# Patient Record
Sex: MALE | Race: WHITE | NOT HISPANIC OR LATINO
[De-identification: names, ages, dates, MRNs, and addresses within clinical notes are randomized per-mention and may not be internally consistent; named-entity substitution may affect disease eponyms.]

---

## 2018-12-11 PROBLEM — Z00.00 ENCOUNTER FOR PREVENTIVE HEALTH EXAMINATION: Status: ACTIVE | Noted: 2018-12-11

## 2018-12-12 ENCOUNTER — APPOINTMENT (OUTPATIENT)
Dept: ENDOCRINOLOGY | Facility: CLINIC | Age: 53
End: 2018-12-12
Payer: MEDICAID

## 2018-12-12 VITALS
TEMPERATURE: 98 F | HEART RATE: 91 BPM | WEIGHT: 134 LBS | DIASTOLIC BLOOD PRESSURE: 80 MMHG | SYSTOLIC BLOOD PRESSURE: 110 MMHG | BODY MASS INDEX: 22.33 KG/M2 | OXYGEN SATURATION: 98 % | HEIGHT: 64.96 IN

## 2018-12-12 DIAGNOSIS — Z87.891 PERSONAL HISTORY OF NICOTINE DEPENDENCE: ICD-10-CM

## 2018-12-12 DIAGNOSIS — Z86.39 PERSONAL HISTORY OF OTHER ENDOCRINE, NUTRITIONAL AND METABOLIC DISEASE: ICD-10-CM

## 2018-12-12 DIAGNOSIS — Z78.9 OTHER SPECIFIED HEALTH STATUS: ICD-10-CM

## 2018-12-12 DIAGNOSIS — Z86.59 PERSONAL HISTORY OF OTHER MENTAL AND BEHAVIORAL DISORDERS: ICD-10-CM

## 2018-12-12 PROCEDURE — 36415 COLL VENOUS BLD VENIPUNCTURE: CPT

## 2018-12-12 PROCEDURE — 99204 OFFICE O/P NEW MOD 45 MIN: CPT | Mod: 25

## 2018-12-12 RX ORDER — METHIMAZOLE 5 MG/1
5 TABLET ORAL DAILY
Qty: 90 | Refills: 0 | Status: ACTIVE | COMMUNITY
Start: 2018-12-12 | End: 1900-01-01

## 2018-12-14 LAB
ERYTHROCYTE [SEDIMENTATION RATE] IN BLOOD BY WESTERGREN METHOD: 14 MM/HR
T3 SERPL-MCNC: 128 NG/DL

## 2018-12-17 LAB — TSI ACT/NOR SER: <0.1 IU/L

## 2018-12-19 ENCOUNTER — APPOINTMENT (OUTPATIENT)
Dept: ENDOCRINOLOGY | Facility: CLINIC | Age: 53
End: 2018-12-19
Payer: MEDICAID

## 2018-12-19 PROCEDURE — 36415 COLL VENOUS BLD VENIPUNCTURE: CPT

## 2018-12-20 LAB
ALBUMIN SERPL ELPH-MCNC: 4.5 G/DL
ALP BLD-CCNC: 61 U/L
ALT SERPL-CCNC: 13 U/L
ANION GAP SERPL CALC-SCNC: 10 MMOL/L
AST SERPL-CCNC: 20 U/L
BASOPHILS # BLD AUTO: 0.04 K/UL
BASOPHILS NFR BLD AUTO: 0.7 %
BILIRUB SERPL-MCNC: 0.4 MG/DL
BUN SERPL-MCNC: 21 MG/DL
CALCIUM SERPL-MCNC: 9.6 MG/DL
CHLORIDE SERPL-SCNC: 105 MMOL/L
CO2 SERPL-SCNC: 25 MMOL/L
CREAT SERPL-MCNC: 0.86 MG/DL
EOSINOPHIL # BLD AUTO: 0.12 K/UL
EOSINOPHIL NFR BLD AUTO: 2.1 %
GLUCOSE SERPL-MCNC: 86 MG/DL
HCT VFR BLD CALC: 45 %
HGB BLD-MCNC: 14.3 G/DL
IMM GRANULOCYTES NFR BLD AUTO: 0 %
LYMPHOCYTES # BLD AUTO: 1.83 K/UL
LYMPHOCYTES NFR BLD AUTO: 31.4 %
MAN DIFF?: NORMAL
MCHC RBC-ENTMCNC: 28.5 PG
MCHC RBC-ENTMCNC: 31.8 GM/DL
MCV RBC AUTO: 89.8 FL
MONOCYTES # BLD AUTO: 0.37 K/UL
MONOCYTES NFR BLD AUTO: 6.3 %
NEUTROPHILS # BLD AUTO: 3.47 K/UL
NEUTROPHILS NFR BLD AUTO: 59.5 %
PLATELET # BLD AUTO: 372 K/UL
POTASSIUM SERPL-SCNC: 5 MMOL/L
PROT SERPL-MCNC: 7.4 G/DL
RBC # BLD: 5.01 M/UL
RBC # FLD: 13.2 %
SODIUM SERPL-SCNC: 140 MMOL/L
T4 FREE SERPL-MCNC: 2.2 NG/DL
TSH SERPL-ACNC: <0.01 UIU/ML
WBC # FLD AUTO: 5.83 K/UL

## 2018-12-26 ENCOUNTER — APPOINTMENT (OUTPATIENT)
Dept: ENDOCRINOLOGY | Facility: CLINIC | Age: 53
End: 2018-12-26
Payer: MEDICAID

## 2018-12-26 VITALS
DIASTOLIC BLOOD PRESSURE: 58 MMHG | TEMPERATURE: 97.9 F | HEIGHT: 64.96 IN | OXYGEN SATURATION: 98 % | HEART RATE: 78 BPM | BODY MASS INDEX: 22.33 KG/M2 | WEIGHT: 134 LBS | SYSTOLIC BLOOD PRESSURE: 106 MMHG

## 2018-12-26 PROCEDURE — 99214 OFFICE O/P EST MOD 30 MIN: CPT | Mod: 25

## 2018-12-26 PROCEDURE — 36415 COLL VENOUS BLD VENIPUNCTURE: CPT

## 2018-12-27 LAB
ALBUMIN SERPL ELPH-MCNC: 4.3 G/DL
ALP BLD-CCNC: 71 U/L
ALT SERPL-CCNC: 19 U/L
ANION GAP SERPL CALC-SCNC: 12 MMOL/L
AST SERPL-CCNC: 20 U/L
BASOPHILS # BLD AUTO: 0.03 K/UL
BASOPHILS NFR BLD AUTO: 0.5 %
BILIRUB SERPL-MCNC: 0.3 MG/DL
BUN SERPL-MCNC: 16 MG/DL
CALCIUM SERPL-MCNC: 9.9 MG/DL
CHLORIDE SERPL-SCNC: 106 MMOL/L
CO2 SERPL-SCNC: 24 MMOL/L
CREAT SERPL-MCNC: 0.84 MG/DL
EOSINOPHIL # BLD AUTO: 0.14 K/UL
EOSINOPHIL NFR BLD AUTO: 2.6 %
GLUCOSE SERPL-MCNC: 93 MG/DL
HCT VFR BLD CALC: 44.8 %
HGB BLD-MCNC: 14.1 G/DL
IMM GRANULOCYTES NFR BLD AUTO: 0.2 %
LYMPHOCYTES # BLD AUTO: 1.74 K/UL
LYMPHOCYTES NFR BLD AUTO: 31.8 %
MAN DIFF?: NORMAL
MCHC RBC-ENTMCNC: 28.5 PG
MCHC RBC-ENTMCNC: 31.5 GM/DL
MCV RBC AUTO: 90.7 FL
MONOCYTES # BLD AUTO: 0.25 K/UL
MONOCYTES NFR BLD AUTO: 4.6 %
NEUTROPHILS # BLD AUTO: 3.3 K/UL
NEUTROPHILS NFR BLD AUTO: 60.3 %
PLATELET # BLD AUTO: 325 K/UL
POTASSIUM SERPL-SCNC: 4.7 MMOL/L
PROT SERPL-MCNC: 7.3 G/DL
RBC # BLD: 4.94 M/UL
RBC # FLD: 13.1 %
SODIUM SERPL-SCNC: 142 MMOL/L
T3 SERPL-MCNC: 160 NG/DL
T4 FREE SERPL-MCNC: 2 NG/DL
TSH SERPL-ACNC: <0.01 UIU/ML
WBC # FLD AUTO: 5.47 K/UL

## 2019-01-17 ENCOUNTER — APPOINTMENT (OUTPATIENT)
Dept: ENDOCRINOLOGY | Facility: CLINIC | Age: 54
End: 2019-01-17
Payer: MEDICAID

## 2019-01-17 VITALS
SYSTOLIC BLOOD PRESSURE: 106 MMHG | WEIGHT: 134 LBS | TEMPERATURE: 97.7 F | HEART RATE: 66 BPM | BODY MASS INDEX: 22.33 KG/M2 | DIASTOLIC BLOOD PRESSURE: 68 MMHG | OXYGEN SATURATION: 98 % | HEIGHT: 64.96 IN

## 2019-01-17 DIAGNOSIS — E05.90 THYROTOXICOSIS, UNSPECIFIED W/OUT THYROTOXIC CRISIS OR STORM: ICD-10-CM

## 2019-01-17 PROCEDURE — 99213 OFFICE O/P EST LOW 20 MIN: CPT | Mod: 25

## 2019-01-17 PROCEDURE — 36415 COLL VENOUS BLD VENIPUNCTURE: CPT

## 2019-01-17 NOTE — REVIEW OF SYSTEMS
[Recent Weight Gain (___ Lbs)] : recent [unfilled] ~Ulb weight gain [All other systems negative] : All other systems negative

## 2019-01-17 NOTE — PHYSICAL EXAM
[Alert] : alert [No Acute Distress] : no acute distress [Well Nourished] : well nourished [Well Developed] : well developed [Healthy Appearance] : healthy appearance [Normal Voice/Communication] : normal voice communication [Normal Sclera/Conjunctiva] : normal sclera/conjunctiva [No Proptosis] : no proptosis [No Lid Lag] : no lid lag [Normal Oropharynx] : the oropharynx was normal [No Neck Mass] : no neck mass was observed [Supple] : the neck was supple [No LAD] : no lymphadenopathy [Thyroid Not Enlarged] : the thyroid was not enlarged [No Thyroid Nodules] : there were no palpable thyroid nodules [No Respiratory Distress] : no respiratory distress [Normal Rate and Effort] : normal respiratory rhythm and effort [Clear to Auscultation] : lungs were clear to auscultation bilaterally [Normal Rate] : heart rate was normal  [Normal S1, S2] : normal S1 and S2 [Regular Rhythm] : with a regular rhythm [No Edema] : there was no peripheral edema [Not Tender] : non-tender [Soft] : abdomen soft [Not Distended] : not distended [No Stigmata of Cushings Syndrome] : no stigmata of cushings syndrome [Normal Gait] : normal gait [No Rash] : no rash [Oriented x3] : oriented to person, place, and time [Normal Insight/Judgement] : insight and judgment were intact [Normal Affect] : the affect was normal [Normal Mood] : the mood was normal [Recent Memory Normal] : recent memory was not impaired [de-identified] : left arm in brace [de-identified] : patellar

## 2019-01-17 NOTE — HISTORY OF PRESENT ILLNESS
[FreeTextEntry1] : CC: "Thyroid check"\par This is a 53 year old male with hyperthyroidism likely due to Graves Disease here for follow up. He has been on methimazole 5mg qd since 12/14/18. He reports that his stress/anxiety has improved since starting methimazole. He denies sore throat/fever/pruritis/change in stool or urine color.   \par Per pt he denies prior history of thyroid disorder. Denies family history of thyroid disease. There is no history of Amiodarone, glucocorticoid, or Lithium use. He denies recent URI or IV contrast. \par \par \par

## 2019-01-17 NOTE — ASSESSMENT
[FreeTextEntry1] : 53 year old male with hyperthyroidism likely due to Graves Disease on methimazole 5mg QD, here for follow up. \par 1.Hyperthyroidism \par -he is clinically euthyroid and reports feeling improved since starting methimazole \par -cont methimazole 5mg qd for now \par -check labs today including CBC, CMP, TSH, free T4, total T3\par -treatment options for hyperthyroidism reviewed including continuing methimazole vs radioactive iodine vs thyroidectomy. He is interested in radioactive iodine. Will discuss further after bloodwork is reviewed.  [Methimazole Therapy] : Risks and benefits of methimazole therapy were discussed with the patient,  including rash, liver dysfunction, and agranulocytosis.  Patient was instructed to call the office for flu-like symptoms eg fever and sore-throat

## 2019-01-18 LAB
ALBUMIN SERPL ELPH-MCNC: 3.9 G/DL
ALP BLD-CCNC: 56 U/L
ALT SERPL-CCNC: 17 U/L
ANION GAP SERPL CALC-SCNC: 12 MMOL/L
AST SERPL-CCNC: 20 U/L
BASOPHILS # BLD AUTO: 0.02 K/UL
BASOPHILS NFR BLD AUTO: 0.4 %
BILIRUB SERPL-MCNC: 0.4 MG/DL
BUN SERPL-MCNC: 15 MG/DL
CALCIUM SERPL-MCNC: 9.6 MG/DL
CHLORIDE SERPL-SCNC: 103 MMOL/L
CO2 SERPL-SCNC: 24 MMOL/L
CREAT SERPL-MCNC: 0.77 MG/DL
EOSINOPHIL # BLD AUTO: 0.12 K/UL
EOSINOPHIL NFR BLD AUTO: 2.3 %
GLUCOSE SERPL-MCNC: 108 MG/DL
HCT VFR BLD CALC: 43.5 %
HGB BLD-MCNC: 13.9 G/DL
IMM GRANULOCYTES NFR BLD AUTO: 0.2 %
LYMPHOCYTES # BLD AUTO: 1.72 K/UL
LYMPHOCYTES NFR BLD AUTO: 32.7 %
MAN DIFF?: NORMAL
MCHC RBC-ENTMCNC: 28.6 PG
MCHC RBC-ENTMCNC: 32 GM/DL
MCV RBC AUTO: 89.5 FL
MONOCYTES # BLD AUTO: 0.24 K/UL
MONOCYTES NFR BLD AUTO: 4.6 %
NEUTROPHILS # BLD AUTO: 3.15 K/UL
NEUTROPHILS NFR BLD AUTO: 59.8 %
PLATELET # BLD AUTO: 340 K/UL
POTASSIUM SERPL-SCNC: 4.4 MMOL/L
PROT SERPL-MCNC: 7 G/DL
RBC # BLD: 4.86 M/UL
RBC # FLD: 12.8 %
SODIUM SERPL-SCNC: 139 MMOL/L
T3 SERPL-MCNC: 156 NG/DL
T4 FREE SERPL-MCNC: 2.1 NG/DL
TSH SERPL-ACNC: <0.01 UIU/ML
WBC # FLD AUTO: 5.26 K/UL

## 2019-02-05 ENCOUNTER — APPOINTMENT (OUTPATIENT)
Dept: ENDOCRINOLOGY | Facility: CLINIC | Age: 54
End: 2019-02-05
Payer: MEDICAID

## 2019-02-05 PROCEDURE — 36415 COLL VENOUS BLD VENIPUNCTURE: CPT

## 2019-02-06 LAB
ALBUMIN SERPL ELPH-MCNC: 4.2 G/DL
ALP BLD-CCNC: 60 U/L
ALT SERPL-CCNC: 14 U/L
ANION GAP SERPL CALC-SCNC: 12 MMOL/L
AST SERPL-CCNC: 18 U/L
BASOPHILS # BLD AUTO: 0.02 K/UL
BASOPHILS NFR BLD AUTO: 0.4 %
BILIRUB SERPL-MCNC: 0.7 MG/DL
BUN SERPL-MCNC: 13 MG/DL
CALCIUM SERPL-MCNC: 9.5 MG/DL
CHLORIDE SERPL-SCNC: 104 MMOL/L
CO2 SERPL-SCNC: 25 MMOL/L
CREAT SERPL-MCNC: 0.8 MG/DL
EOSINOPHIL # BLD AUTO: 0.14 K/UL
EOSINOPHIL NFR BLD AUTO: 2.8 %
GLUCOSE SERPL-MCNC: 88 MG/DL
HCT VFR BLD CALC: 43.6 %
HGB BLD-MCNC: 13.5 G/DL
IMM GRANULOCYTES NFR BLD AUTO: 0.2 %
LYMPHOCYTES # BLD AUTO: 1.57 K/UL
LYMPHOCYTES NFR BLD AUTO: 31.3 %
MAN DIFF?: NORMAL
MCHC RBC-ENTMCNC: 27.9 PG
MCHC RBC-ENTMCNC: 31 GM/DL
MCV RBC AUTO: 90.1 FL
MONOCYTES # BLD AUTO: 0.37 K/UL
MONOCYTES NFR BLD AUTO: 7.4 %
NEUTROPHILS # BLD AUTO: 2.9 K/UL
NEUTROPHILS NFR BLD AUTO: 57.9 %
PLATELET # BLD AUTO: 309 K/UL
POTASSIUM SERPL-SCNC: 4.6 MMOL/L
PROT SERPL-MCNC: 6.9 G/DL
RBC # BLD: 4.84 M/UL
RBC # FLD: 13.3 %
SODIUM SERPL-SCNC: 141 MMOL/L
WBC # FLD AUTO: 5.01 K/UL

## 2019-02-07 LAB
T3 SERPL-MCNC: 145 NG/DL
T4 FREE SERPL-MCNC: 1.8 NG/DL
TSH SERPL-ACNC: <0.01 UIU/ML

## 2019-02-25 ENCOUNTER — APPOINTMENT (OUTPATIENT)
Dept: NUCLEAR MEDICINE | Facility: HOSPITAL | Age: 54
End: 2019-02-25

## 2019-02-26 ENCOUNTER — APPOINTMENT (OUTPATIENT)
Dept: NUCLEAR MEDICINE | Facility: HOSPITAL | Age: 54
End: 2019-02-26

## 2019-02-28 ENCOUNTER — OUTPATIENT (OUTPATIENT)
Dept: OUTPATIENT SERVICES | Facility: HOSPITAL | Age: 54
LOS: 1 days | End: 2019-02-28
Payer: COMMERCIAL

## 2019-03-01 PROCEDURE — 78014 THYROID IMAGING W/BLOOD FLOW: CPT | Mod: 26

## 2019-03-01 PROCEDURE — A9516: CPT

## 2019-03-01 PROCEDURE — 78014 THYROID IMAGING W/BLOOD FLOW: CPT

## 2019-04-26 ENCOUNTER — APPOINTMENT (OUTPATIENT)
Dept: ENDOCRINOLOGY | Facility: CLINIC | Age: 54
End: 2019-04-26

## 2020-05-27 ENCOUNTER — TRANSCRIPTION ENCOUNTER (OUTPATIENT)
Age: 55
End: 2020-05-27

## 2020-05-27 VITALS
HEIGHT: 64 IN | WEIGHT: 115.08 LBS | SYSTOLIC BLOOD PRESSURE: 127 MMHG | HEART RATE: 73 BPM | RESPIRATION RATE: 16 BRPM | TEMPERATURE: 98 F | DIASTOLIC BLOOD PRESSURE: 79 MMHG | OXYGEN SATURATION: 99 %

## 2020-05-27 NOTE — H&P ADULT - PROBLEM SELECTOR PLAN 1
Admit to Orthopaedic Service.  Presents today for elective ACDF c5-6  Pt medically stable and cleared for procedure today by

## 2020-05-27 NOTE — H&P ADULT - NSHPLABSRESULTS_GEN_ALL_CORE
Preop cbc, bmp, pt/inr, ptt, ua wnl;  3M DOS  preop cxr wnl per clearance  preop ekg NSR wnl per clearance Preop cbc, bmp, pt/inr, ptt, ua wnl;  3M DOS  preop cxr wnl per clearance  preop ekg NSR wnl per clearance  COVID19 swab negative 5/26/2020

## 2020-05-27 NOTE — H&P ADULT - NSHPPHYSICALEXAM_GEN_ALL_CORE
PE: Normal head, atraumatic. Normal skin, no rashes/lesions/erythema.        Rest of PE per medical clearance. General:  Alert and oriented, NAD  MSK:  Decreased ROM of cervical spine secondary to pain.   AIN/PIN/Ulnar nerve intact b/l ue   Radial pulse 2+ b/l ue  Gross sensation to light touch diminished throughout LUE compared to RUE   RUE:  biceps/triceps//delt 5/5   LUE /triceps/delt 3/5, biceps 5/5  Remainder of PE as per medical clearance       Rest of PE per medical clearance.

## 2020-05-27 NOTE — H&P ADULT - HISTORY OF PRESENT ILLNESS
54yM with neck pain x  Patient here for ACDF C5-6 54yM with neck pain radiating down LUE x 2 years starting november 2018 s/p fall.  Pt states pain began s/p falling off a scaffolding landing on his neck and LUE.  Pt states that he has been experiencing numbness throughout his LUE.   Pt takes naproxene for his pain with little relief.   Pt ambulates independently without assistive device.  Pt has attempted and faild conservative treatement for his cervical spine pain consisting of multiple ROSEMARIE and PT.  Pt not on any anticoagulation meds and denies hx of DVT. Pt denies numbness and tingling down RUE, fever, chills, recent illness, CP, SOB any other complaints.   Patient here for ACDF C5-6

## 2020-05-28 ENCOUNTER — RESULT REVIEW (OUTPATIENT)
Age: 55
End: 2020-05-28

## 2020-05-28 ENCOUNTER — INPATIENT (INPATIENT)
Facility: HOSPITAL | Age: 55
LOS: 0 days | Discharge: HOME | DRG: 473 | End: 2020-05-29
Attending: ORTHOPAEDIC SURGERY | Admitting: ORTHOPAEDIC SURGERY
Payer: OTHER MISCELLANEOUS

## 2020-05-28 DIAGNOSIS — C64.9 MALIGNANT NEOPLASM OF UNSPECIFIED KIDNEY, EXCEPT RENAL PELVIS: ICD-10-CM

## 2020-05-28 DIAGNOSIS — N20.0 CALCULUS OF KIDNEY: ICD-10-CM

## 2020-05-28 DIAGNOSIS — E06.3 AUTOIMMUNE THYROIDITIS: ICD-10-CM

## 2020-05-28 DIAGNOSIS — M54.12 RADICULOPATHY, CERVICAL REGION: ICD-10-CM

## 2020-05-28 PROCEDURE — 88304 TISSUE EXAM BY PATHOLOGIST: CPT | Mod: 26

## 2020-05-28 RX ORDER — CYCLOBENZAPRINE HYDROCHLORIDE 10 MG/1
5 TABLET, FILM COATED ORAL EVERY 8 HOURS
Refills: 0 | Status: DISCONTINUED | OUTPATIENT
Start: 2020-05-28 | End: 2020-05-29

## 2020-05-28 RX ORDER — OXYCODONE AND ACETAMINOPHEN 5; 325 MG/1; MG/1
2 TABLET ORAL EVERY 6 HOURS
Refills: 0 | Status: DISCONTINUED | OUTPATIENT
Start: 2020-05-28 | End: 2020-05-29

## 2020-05-28 RX ORDER — DEXAMETHASONE 0.5 MG/5ML
10 ELIXIR ORAL EVERY 8 HOURS
Refills: 0 | Status: COMPLETED | OUTPATIENT
Start: 2020-05-28 | End: 2020-05-29

## 2020-05-28 RX ORDER — HYDROMORPHONE HYDROCHLORIDE 2 MG/ML
0.5 INJECTION INTRAMUSCULAR; INTRAVENOUS; SUBCUTANEOUS
Refills: 0 | Status: DISCONTINUED | OUTPATIENT
Start: 2020-05-28 | End: 2020-05-29

## 2020-05-28 RX ORDER — OXYCODONE AND ACETAMINOPHEN 5; 325 MG/1; MG/1
1 TABLET ORAL EVERY 4 HOURS
Refills: 0 | Status: DISCONTINUED | OUTPATIENT
Start: 2020-05-28 | End: 2020-05-29

## 2020-05-28 RX ORDER — SENNA PLUS 8.6 MG/1
2 TABLET ORAL AT BEDTIME
Refills: 0 | Status: DISCONTINUED | OUTPATIENT
Start: 2020-05-28 | End: 2020-05-29

## 2020-05-28 RX ORDER — SODIUM CHLORIDE 9 MG/ML
1000 INJECTION, SOLUTION INTRAVENOUS
Refills: 0 | Status: DISCONTINUED | OUTPATIENT
Start: 2020-05-28 | End: 2020-05-29

## 2020-05-28 RX ORDER — ONDANSETRON 8 MG/1
4 TABLET, FILM COATED ORAL EVERY 6 HOURS
Refills: 0 | Status: DISCONTINUED | OUTPATIENT
Start: 2020-05-28 | End: 2020-05-29

## 2020-05-28 RX ORDER — CEFAZOLIN SODIUM 1 G
2000 VIAL (EA) INJECTION EVERY 8 HOURS
Refills: 0 | Status: COMPLETED | OUTPATIENT
Start: 2020-05-28 | End: 2020-05-29

## 2020-05-28 RX ORDER — ACETAMINOPHEN 500 MG
650 TABLET ORAL EVERY 6 HOURS
Refills: 0 | Status: DISCONTINUED | OUTPATIENT
Start: 2020-05-28 | End: 2020-05-29

## 2020-05-28 RX ADMIN — Medication 100 MILLIGRAM(S): at 17:38

## 2020-05-28 RX ADMIN — OXYCODONE AND ACETAMINOPHEN 1 TABLET(S): 5; 325 TABLET ORAL at 14:20

## 2020-05-28 RX ADMIN — SODIUM CHLORIDE 125 MILLILITER(S): 9 INJECTION, SOLUTION INTRAVENOUS at 21:32

## 2020-05-28 RX ADMIN — CYCLOBENZAPRINE HYDROCHLORIDE 5 MILLIGRAM(S): 10 TABLET, FILM COATED ORAL at 21:17

## 2020-05-28 RX ADMIN — Medication 102 MILLIGRAM(S): at 18:29

## 2020-05-28 RX ADMIN — HYDROMORPHONE HYDROCHLORIDE 0.5 MILLIGRAM(S): 2 INJECTION INTRAMUSCULAR; INTRAVENOUS; SUBCUTANEOUS at 09:50

## 2020-05-28 NOTE — PROGRESS NOTE ADULT - SUBJECTIVE AND OBJECTIVE BOX
Orthopaedics Post Op Check    Procedure: ACDF C5-C6  Surgeon: Dr. Juarez     Pt comfortable, without complaints  Denies CP, SOB, N/V, numbness/tingling     Vital Signs Last 24 Hrs  T(C): 36.6 (28 May 2020 12:00), Max: 36.6 (28 May 2020 12:00)  T(F): 97.8 (28 May 2020 12:00), Max: 97.8 (28 May 2020 12:00)  HR: 71 (28 May 2020 13:30) (60 - 82)  BP: 117/71 (28 May 2020 13:30) (116/70 - 138/82)  BP(mean): 84 (28 May 2020 12:00) (84 - 103)  RR: 17 (28 May 2020 13:30) (12 - 19)  SpO2: 96% (28 May 2020 13:30) (96% - 100%)  AVSS, NAD    Dressing C/D/I; HV x 1   General: Pt Alert and oriented     Sensation intact to bilateral UE distally. Motor Strength 4/5 to /interossei/triceps/biceps Left upper extremity, 5/5 right upper extremity. AIN/PIN intact.   Radial pulses 2+         Post op XR: Fluoroscopy utilized intra op to confirm hardware placement, final XR pending POD #2    A/P: 54yMale POD#0 s/p ACDF C5-C6  - Stable  - Pain Control  - DVT ppx: SCDs  - Post op abx: Ancef  - PT, WBS: WBAT  - F/U AM Labs

## 2020-05-28 NOTE — PACU DISCHARGE NOTE - COMMENTS
PACU CRITERIA MET; PATIENT WAS SEEN IN PACU BY DR. MALONEY. CHECKED WITH MD IF PATIENT NEEDS A CERVICAL COLLAR. "NO COLLAR" PER DR. MALONEY. REPORT GIVEN TO MISS DEANNE NIEVES. PATIENT TO BE TRANSPORTED TO [address] [place] [address], MONITORED, BY RN AND CNA.

## 2020-05-29 ENCOUNTER — TRANSCRIPTION ENCOUNTER (OUTPATIENT)
Age: 55
End: 2020-05-29

## 2020-05-29 VITALS
DIASTOLIC BLOOD PRESSURE: 70 MMHG | RESPIRATION RATE: 16 BRPM | SYSTOLIC BLOOD PRESSURE: 119 MMHG | OXYGEN SATURATION: 96 % | HEART RATE: 84 BPM | TEMPERATURE: 98 F

## 2020-05-29 LAB
ANION GAP SERPL CALC-SCNC: 9 MMOL/L — SIGNIFICANT CHANGE UP (ref 5–17)
BASOPHILS # BLD AUTO: 0.01 K/UL — SIGNIFICANT CHANGE UP (ref 0–0.2)
BASOPHILS NFR BLD AUTO: 0.1 % — SIGNIFICANT CHANGE UP (ref 0–2)
BUN SERPL-MCNC: 11 MG/DL — SIGNIFICANT CHANGE UP (ref 7–23)
CALCIUM SERPL-MCNC: 9 MG/DL — SIGNIFICANT CHANGE UP (ref 8.4–10.5)
CHLORIDE SERPL-SCNC: 105 MMOL/L — SIGNIFICANT CHANGE UP (ref 96–108)
CO2 SERPL-SCNC: 23 MMOL/L — SIGNIFICANT CHANGE UP (ref 22–31)
CREAT SERPL-MCNC: 0.75 MG/DL — SIGNIFICANT CHANGE UP (ref 0.5–1.3)
EOSINOPHIL # BLD AUTO: 0 K/UL — SIGNIFICANT CHANGE UP (ref 0–0.5)
EOSINOPHIL NFR BLD AUTO: 0 % — SIGNIFICANT CHANGE UP (ref 0–6)
GLUCOSE SERPL-MCNC: 124 MG/DL — HIGH (ref 70–99)
HCT VFR BLD CALC: 39.2 % — SIGNIFICANT CHANGE UP (ref 39–50)
HCV AB S/CO SERPL IA: 0.08 S/CO — SIGNIFICANT CHANGE UP
HCV AB SERPL-IMP: SIGNIFICANT CHANGE UP
HGB BLD-MCNC: 13.3 G/DL — SIGNIFICANT CHANGE UP (ref 13–17)
IMM GRANULOCYTES NFR BLD AUTO: 0.6 % — SIGNIFICANT CHANGE UP (ref 0–1.5)
LYMPHOCYTES # BLD AUTO: 0.93 K/UL — LOW (ref 1–3.3)
LYMPHOCYTES # BLD AUTO: 10.7 % — LOW (ref 13–44)
MCHC RBC-ENTMCNC: 28.6 PG — SIGNIFICANT CHANGE UP (ref 27–34)
MCHC RBC-ENTMCNC: 33.9 GM/DL — SIGNIFICANT CHANGE UP (ref 32–36)
MCV RBC AUTO: 84.3 FL — SIGNIFICANT CHANGE UP (ref 80–100)
MONOCYTES # BLD AUTO: 0.37 K/UL — SIGNIFICANT CHANGE UP (ref 0–0.9)
MONOCYTES NFR BLD AUTO: 4.3 % — SIGNIFICANT CHANGE UP (ref 2–14)
NEUTROPHILS # BLD AUTO: 7.3 K/UL — SIGNIFICANT CHANGE UP (ref 1.8–7.4)
NEUTROPHILS NFR BLD AUTO: 84.3 % — HIGH (ref 43–77)
NRBC # BLD: 0 /100 WBCS — SIGNIFICANT CHANGE UP (ref 0–0)
PLATELET # BLD AUTO: 271 K/UL — SIGNIFICANT CHANGE UP (ref 150–400)
POTASSIUM SERPL-MCNC: 3.9 MMOL/L — SIGNIFICANT CHANGE UP (ref 3.5–5.3)
POTASSIUM SERPL-SCNC: 3.9 MMOL/L — SIGNIFICANT CHANGE UP (ref 3.5–5.3)
RBC # BLD: 4.65 M/UL — SIGNIFICANT CHANGE UP (ref 4.2–5.8)
RBC # FLD: 13 % — SIGNIFICANT CHANGE UP (ref 10.3–14.5)
SODIUM SERPL-SCNC: 137 MMOL/L — SIGNIFICANT CHANGE UP (ref 135–145)
SURGICAL PATHOLOGY STUDY: SIGNIFICANT CHANGE UP
WBC # BLD: 8.66 K/UL — SIGNIFICANT CHANGE UP (ref 3.8–10.5)
WBC # FLD AUTO: 8.66 K/UL — SIGNIFICANT CHANGE UP (ref 3.8–10.5)

## 2020-05-29 PROCEDURE — 76000 FLUOROSCOPY <1 HR PHYS/QHP: CPT

## 2020-05-29 PROCEDURE — 80048 BASIC METABOLIC PNL TOTAL CA: CPT

## 2020-05-29 PROCEDURE — 36415 COLL VENOUS BLD VENIPUNCTURE: CPT

## 2020-05-29 PROCEDURE — 86850 RBC ANTIBODY SCREEN: CPT

## 2020-05-29 PROCEDURE — 88304 TISSUE EXAM BY PATHOLOGIST: CPT

## 2020-05-29 PROCEDURE — 95940 IONM IN OPERATNG ROOM 15 MIN: CPT

## 2020-05-29 PROCEDURE — 86901 BLOOD TYPING SEROLOGIC RH(D): CPT

## 2020-05-29 PROCEDURE — 72040 X-RAY EXAM NECK SPINE 2-3 VW: CPT | Mod: 26

## 2020-05-29 PROCEDURE — C1889: CPT

## 2020-05-29 PROCEDURE — C1713: CPT

## 2020-05-29 PROCEDURE — 86803 HEPATITIS C AB TEST: CPT

## 2020-05-29 PROCEDURE — 85025 COMPLETE CBC W/AUTO DIFF WBC: CPT

## 2020-05-29 PROCEDURE — 72040 X-RAY EXAM NECK SPINE 2-3 VW: CPT

## 2020-05-29 PROCEDURE — 99253 IP/OBS CNSLTJ NEW/EST LOW 45: CPT

## 2020-05-29 RX ORDER — POLYETHYLENE GLYCOL 3350 17 G/17G
17 POWDER, FOR SOLUTION ORAL
Qty: 0 | Refills: 0 | DISCHARGE
Start: 2020-05-29

## 2020-05-29 RX ORDER — CYCLOBENZAPRINE HYDROCHLORIDE 10 MG/1
1 TABLET, FILM COATED ORAL
Qty: 21 | Refills: 0
Start: 2020-05-29 | End: 2020-06-04

## 2020-05-29 RX ORDER — ACETAMINOPHEN 500 MG
2 TABLET ORAL
Qty: 0 | Refills: 0 | DISCHARGE
Start: 2020-05-29

## 2020-05-29 RX ORDER — SENNA PLUS 8.6 MG/1
2 TABLET ORAL
Qty: 0 | Refills: 0 | DISCHARGE
Start: 2020-05-29

## 2020-05-29 RX ORDER — POLYETHYLENE GLYCOL 3350 17 G/17G
17 POWDER, FOR SOLUTION ORAL DAILY
Refills: 0 | Status: DISCONTINUED | OUTPATIENT
Start: 2020-05-29 | End: 2020-05-29

## 2020-05-29 RX ADMIN — OXYCODONE AND ACETAMINOPHEN 1 TABLET(S): 5; 325 TABLET ORAL at 00:06

## 2020-05-29 RX ADMIN — Medication 102 MILLIGRAM(S): at 09:12

## 2020-05-29 RX ADMIN — SODIUM CHLORIDE 125 MILLILITER(S): 9 INJECTION, SOLUTION INTRAVENOUS at 08:30

## 2020-05-29 RX ADMIN — Medication 102 MILLIGRAM(S): at 02:30

## 2020-05-29 RX ADMIN — CYCLOBENZAPRINE HYDROCHLORIDE 5 MILLIGRAM(S): 10 TABLET, FILM COATED ORAL at 05:29

## 2020-05-29 RX ADMIN — Medication 100 MILLIGRAM(S): at 00:02

## 2020-05-29 NOTE — DISCHARGE NOTE PROVIDER - NSDCACTIVITY_GEN_ALL_CORE
No heavy lifting/straining/Do not make important decisions/Stairs allowed/Walking - Indoors allowed/Do not drive or operate machinery

## 2020-05-29 NOTE — PROGRESS NOTE ADULT - SUBJECTIVE AND OBJECTIVE BOX
Ortho Note    Pt comfortable without complaints, pain controlled  Denies CP, SOB, N/V, numbness/tingling     Vital Signs Last 24 Hrs  T(C): 36.7 (05-29-20 @ 09:10), Max: 36.7 (05-29-20 @ 09:10)  T(F): 98 (05-29-20 @ 09:10), Max: 98 (05-29-20 @ 09:10)  HR: 84 (05-29-20 @ 09:10) (82 - 84)  BP: 119/70 (05-29-20 @ 09:10) (102/59 - 119/70)  BP(mean): --  RR: 16 (05-29-20 @ 09:10) (15 - 16)  SpO2: 96% (05-29-20 @ 09:10) (95% - 96%)  AVSS    General: Pt Alert and oriented, NAD  DSG: cervical Prineo C/D/I, HVx1 removed under sterile technique, site cleaned with chlorhexidine and cover with dry dressing.  Pulses: bilateral radial 2+, cap refill <2sec fingers, wwp fingers  Sensation: bilateral SILT  Motor: bilateral /bicep/tricep/deltoid 5/5                          13.3   8.66  )-----------( 271      ( 29 May 2020 06:48 )             39.2   29 May 2020 06:48    137    |  105    |  11     ----------------------------<  124    3.9     |  23     |  0.75     Ca    9.0        29 May 2020 06:48        A/P: 54y Male POD#1 s/p ACDF C5-6  - Stable  - Pain Control: po meds  - DVT ppx: SCDs  - PT, WBS: WBAT spinal precautions with [place] J collar.  -Bowel regimen: continue bowel regimen  -Dispo: home.    Ortho Pager [phone]

## 2020-05-29 NOTE — DISCHARGE NOTE PROVIDER - NSDCFUADDINST_GEN_ALL_CORE_FT
No strenuous activity (bending/twisting), heavy lifting, driving or returning to work until cleared by MD.  Change dressing daily with gauze/tape till post-op day #5, then leave incision open to air.  You may shower post-op day#5, keep incision clean and dry.   Try to have regular bowel movements, take stool softener or laxative if necessary.  May apply ice to affected areas to decrease swelling.  Call to schedule an appt with Dr. Juarez for follow up, if you have staples or sutures they will be removed in office.  Contact your doctor if you experience: fever greater than 101.5, chills, chest pain, difficulty breathing, redness or excessive drainage around the incision, other concerns.  Follow up with your primary care provider. Take antibiotic prophylactic Duricef 500 mg twice a day for 7 days.   No strenuous activity (bending/twisting), heavy lifting, driving or returning to work until cleared by MD. Eat soft foods first few days post op.  You may take showers. Your dressing is water resistant. Let soapy water fall over incision and pat dry.   Wear cervical collar when out of bed.  No soaking in bathtubs.  Leave incision open to air. Keep incision clean and dry. Do not remove the dressing/tape overlying your incision.  Try to have regular bowel movements, take stool softener or laxative if necessary.  May apply ice to affected areas to decrease swelling.  Call to schedule an appt with Dr. Juarez for follow up, if you have staples or sutures they will be removed in office.  Contact your doctor if you experience: fever greater than 101.5, chills, chest pain, difficulty breathing, redness or excessive drainage around the incision, other concerns.  Follow up with your primary care provider.

## 2020-05-29 NOTE — DISCHARGE NOTE PROVIDER - NSDCMRMEDTOKEN_GEN_ALL_CORE_FT
Caio STINSON x 1 s/p ACDF C5-C6: acetaminophen 325 mg oral tablet: 2 tab(s) orally every 8 hours, As needed, Temp greater or equal to 38C (100.4F), Mild Pain (1 - 3)  Do not takle in the same time with Percocet (oxycodone-acetaminophen)  Do not exceed 4000mg/day  cefadroxil 500 mg oral capsule: 1 cap(s) orally every 12 hours   cyclobenzaprine 5 mg oral tablet: 1 tab(s) orally every 8 hours -for muscle spasm MDD:3   [place] J x 1 s/p ACDF C5-C6:   oxycodone-acetaminophen 5 mg-325 mg oral tablet: 1 to 2 tab(s) orally every 4 hours, As needed, Moderate to Severe Pain (4 - 6) MDD:6  polyethylene glycol 3350 oral powder for reconstitution: 17 gram(s) orally once a day  senna oral tablet: 2 tab(s) orally once a day (at bedtime) acetaminophen 325 mg oral tablet: 2 tab(s) orally every 8 hours, As needed, Temp greater or equal to 38C (100.4F), Mild Pain (1 - 3)  Do not takle in the same time with Percocet (oxycodone-acetaminophen)  Do not exceed 4000mg/day  cefadroxil 500 mg oral capsule: 1 cap(s) orally every 12 hours   cyclobenzaprine 5 mg oral tablet: 1 tab(s) orally every 8 hours -for muscle spasm MDD:3   [place] J x 1 s/p ACDF C5-C6:   oxycodone-acetaminophen 5 mg-325 mg oral tablet: 1 to 2 tab(s) orally every 4 hours, As needed, Moderate to Severe Pain (4 - 6) MDD:6  polyethylene glycol 3350 oral powder for reconstitution: 17 gram(s) orally once a day  senna oral tablet: 2 tab(s) orally once a day (at bedtime)

## 2020-05-29 NOTE — CONSULT NOTE ADULT - SUBJECTIVE AND OBJECTIVE BOX
54M w h/o BPH (currently not on medications) p/w worsening neck pain 2/2 trauma here for C5-C6 ACDF 5/28    Pt sustained fall off scaffolding landing w subsequent neck and LUE pain and numbness. Today states that pain is controlled and numbness has improved significantly. States swallowing has improved since yesterday. Denies any weakness, chest pain, dyspnea, N/V/Abd pain. Of note states he stopped taking finasteride toward beginning of COVID pandemic but symptoms of nocturia and manageable (urinates 1-2x nightly)    ROS: 12 point ROS reviewed and otherwise negative  FHx: Denies  SH: Previous smoker - quit 15y ago. No EtOH    PAST MEDICAL & SURGICAL HISTORY:  Cervical radiculopathy  Malignant neoplasm of kidney  Autoimmune thyroiditis  Calculus of kidney    Home Meds: Home Medications:  acetaminophen 325 mg oral tablet: 2 tab(s) orally every 8 hours, As needed, Temp greater or equal to 38C (100.4F), Mild Pain (1 - 3)  Do not takle in the same time with Percocet (oxycodone-acetaminophen)  Do not exceed 4000mg/day (29 May 2020 10:26)  polyethylene glycol 3350 oral powder for reconstitution: 17 gram(s) orally once a day (29 May 2020 10:26)  senna oral tablet: 2 tab(s) orally once a day (at bedtime) (29 May 2020 10:26)    Allergies: Allergies    No Known Allergies    Intolerances      Soc:   Advanced Directives: Presumed Full Code     CURRENT MEDICATIONS:   --------------------------------------------------------------------------------------  Neurologic Medications  acetaminophen   Tablet .. 650 milliGRAM(s) Oral every 6 hours PRN Temp greater or equal to 38C (100.4F), Mild Pain (1 - 3)  cyclobenzaprine 5 milliGRAM(s) Oral every 8 hours  HYDROmorphone  Injectable 0.5 milliGRAM(s) IV Push every 15 minutes PRN Severe Pain (7 - 10)  HYDROmorphone  Injectable 0.5 milliGRAM(s) IV Push every 3 hours PRN breakthrough pain  ondansetron Injectable 4 milliGRAM(s) IV Push every 6 hours PRN Nausea  oxycodone    5 mG/acetaminophen 325 mG 1 Tablet(s) Oral every 4 hours PRN Moderate Pain (4 - 6)  oxycodone    5 mG/acetaminophen 325 mG 2 Tablet(s) Oral every 6 hours PRN Severe Pain (7 - 10)    Respiratory Medications    Cardiovascular Medications    Gastrointestinal Medications  multivitamin 1 Tablet(s) Oral daily  polyethylene glycol 3350 17 Gram(s) Oral daily  senna 2 Tablet(s) Oral at bedtime    Genitourinary Medications    Hematologic/Oncologic Medications    Antimicrobial/Immunologic Medications    Endocrine/Metabolic Medications    Topical/Other Medications    --------------------------------------------------------------------------------------    VITAL SIGNS, INS/OUTS (last 24 hours):  --------------------------------------------------------------------------------------  ICU Vital Signs Last 24 Hrs  T(C): 36.7 (29 May 2020 09:10), Max: 36.7 (28 May 2020 16:19)  T(F): 98 (29 May 2020 09:10), Max: 98.1 (29 May 2020 00:00)  HR: 84 (29 May 2020 09:10) (65 - 93)  BP: 119/70 (29 May 2020 09:10) (102/59 - 122/70)  BP(mean): --  ABP: --  ABP(mean): --  RR: 16 (29 May 2020 09:10) (14 - 16)  SpO2: 96% (29 May 2020 09:10) (95% - 97%)    I&O's Summary    28 May 2020 07:01  -  29 May 2020 07:00  --------------------------------------------------------  IN: 2535 mL / OUT: 3415 mL / NET: -880 mL    29 May 2020 07:01  -  29 May 2020 13:56  --------------------------------------------------------  IN: 0 mL / OUT: 0 mL / NET: 0 mL      --------------------------------------------------------------------------------------    EXAM:  GEN: Male in C collar in NAD  HEENT: NC/AT, MMM dressing in anterior neck c/d/i  CV: RRR, nml S1S2, no BLE edema  PULM: nml effort, CTAB  ABD: Soft, NABS, Non-tender  NEURO: Alert, moving all extremities, 5/5 in BUE w sensation equal    LABS  --------------------------------------------------------------------------------------  Labs:  CAPILLARY BLOOD GLUCOSE                              13.3   8.66  )-----------( 271      ( 29 May 2020 06:48 )             39.2       Auto Neutrophil %: 84.3 % (05-29-20 @ 06:48)  Auto Immature Granulocyte %: 0.6 % (05-29-20 @ 06:48)    05-29    137  |  105  |  11  ----------------------------<  124<H>  3.9   |  23  |  0.75      Calcium, Total Serum: 9.0 mg/dL (05-29-20 @ 06:48)      LFTs:         Coags:                  --------------------------------------------------------------------------------------    OTHER LABS    IMAGING RESULTS  ****************      ASSESSMENT/ PLAN:  54y Male ***      Attending aware and agrees with plan

## 2020-05-29 NOTE — DISCHARGE NOTE PROVIDER - CARE PROVIDER_API CALL
Moy Juarez  ORTHOPAEDIC SURGERY  [address] [place], [place] [address]  Phone: [phone]  Fax: [phone]  Follow Up Time: 2 weeks

## 2020-05-29 NOTE — CONSULT NOTE ADULT - ASSESSMENT
54M w h/o BPH (currently not on medications) p/w worsening neck pain 2/2 trauma here for C5-C6 ACDF 5/28    #Post-op state - pain controlled. On bowel regimen. Ambulatory. Recommended for HOME today. PPx: SCDs and ambulatory  #Cervical radiculopathy - improving. Mgmt per primary team  #BPH - Chronic. Pt appears to have sxs managed wo medications at this time. Would defer to outpatient    Recommendations  Patient optimized for disposition by primary team today -- home

## 2020-05-29 NOTE — DISCHARGE NOTE PROVIDER - HOSPITAL COURSE
Admitted 5/28/20    Surgery 5/28/20 ACDF C5-6    Indira-op Antibiotics    Pain control    DVT prophylaxis    OOB/Physical Therapy

## 2020-05-29 NOTE — PROGRESS NOTE ADULT - SUBJECTIVE AND OBJECTIVE BOX
pt seen and examined nad   avss    pe dressing cdi   nvi  power 5/5 sensation grossly intact   no calf tenderness    imp     sp psf    plan   ambulation  pt  pain control  drain removed   dc to home

## 2020-05-29 NOTE — DISCHARGE NOTE NURSING/CASE MANAGEMENT/SOCIAL WORK - PATIENT PORTAL LINK FT
You can access the FollowMyHealth Patient Portal offered by [facility] by registering at the following website: [email or web address]. By joining [facility]’s FollowMyHealth portal, you will also be able to view your health information using other applications (apps) compatible with our system.

## 2020-06-01 DIAGNOSIS — M48.02 SPINAL STENOSIS, CERVICAL REGION: ICD-10-CM

## 2020-06-01 DIAGNOSIS — Z85.528 PERSONAL HISTORY OF OTHER MALIGNANT NEOPLASM OF KIDNEY: ICD-10-CM

## 2020-06-01 DIAGNOSIS — E06.3 AUTOIMMUNE THYROIDITIS: ICD-10-CM

## 2020-06-01 DIAGNOSIS — N40.0 BENIGN PROSTATIC HYPERPLASIA WITHOUT LOWER URINARY TRACT SYMPTOMS: ICD-10-CM

## 2020-06-01 DIAGNOSIS — M54.2 CERVICALGIA: ICD-10-CM

## 2020-06-01 DIAGNOSIS — Z79.82 LONG TERM (CURRENT) USE OF ASPIRIN: ICD-10-CM

## 2020-06-01 DIAGNOSIS — M50.122 CERVICAL DISC DISORDER AT C5-C6 LEVEL WITH RADICULOPATHY: ICD-10-CM

## 2020-06-01 DIAGNOSIS — Z87.891 PERSONAL HISTORY OF NICOTINE DEPENDENCE: ICD-10-CM

## 2020-06-01 DIAGNOSIS — Z87.442 PERSONAL HISTORY OF URINARY CALCULI: ICD-10-CM

## 2021-04-02 NOTE — PRE-OP CHECKLIST - IV STARTED
Relevant Problems   No relevant active problems       Anesthetic History     PONV   Pertinent negatives: No increased risk of difficult airway, pseudocholinesterase deficiency, malignant hyperthermia and history of awareness of surgery under anesthesia       Review of Systems / Medical History  Patient summary reviewed, nursing notes reviewed and pertinent labs reviewed    Pulmonary        Sleep apnea: No treatment      Pertinent negatives: No COPD, asthma, recent URI and smoker  Comments: Mild MIKAEL - no Tx   Neuro/Psych         Psychiatric history  Pertinent negatives: No seizures, neuromuscular disease, TIA and CVA   Cardiovascular            Dysrhythmias     Pertinent negatives: No hypertension, past MI, CAD, PAD, angina and CHF  Exercise tolerance: >4 METS  Comments: Polymorphic Vtach - s/p AICD - no shocks given   GI/Hepatic/Renal     GERD: well controlled      Liver disease  Pertinent negatives: No hepatitis and renal disease  Comments: Fatty liver Endo/Other        Obesity  Pertinent negatives: No diabetes, hypothyroidism, hyperthyroidism and blood dyscrasia   Other Findings              Physical Exam    Airway  Mallampati: III  TM Distance: 4 - 6 cm  Neck ROM: normal range of motion   Mouth opening: Diminished (comment)     Cardiovascular  Regular rate and rhythm,  S1 and S2 normal,  no murmur, click, rub, or gallop             Dental      Comments: Missing one upper left molar   Pulmonary  Breath sounds clear to auscultation               Abdominal  GI exam deferred       Other Findings            Anesthetic Plan    ASA: 3  Anesthesia type: MAC          Induction: Intravenous  Anesthetic plan and risks discussed with: Patient
no

## 2022-06-14 NOTE — H&P ADULT - NSICDXPASTMEDICALHX_GEN_ALL_CORE_FT
PAST MEDICAL HISTORY:  Autoimmune thyroiditis     Calculus of kidney     Cervical radiculopathy     Malignant neoplasm of kidney
14-Jun-2022 22:45